# Patient Record
Sex: MALE | Race: WHITE | NOT HISPANIC OR LATINO | URBAN - METROPOLITAN AREA
[De-identification: names, ages, dates, MRNs, and addresses within clinical notes are randomized per-mention and may not be internally consistent; named-entity substitution may affect disease eponyms.]

---

## 2022-10-31 ENCOUNTER — OFFICE VISIT (OUTPATIENT)
Dept: URBAN - METROPOLITAN AREA CLINIC 111 | Facility: CLINIC | Age: 62
End: 2022-10-31

## 2022-11-30 ENCOUNTER — OFFICE VISIT (OUTPATIENT)
Dept: URBAN - METROPOLITAN AREA CLINIC 105 | Facility: CLINIC | Age: 62
End: 2022-11-30

## 2022-12-22 ENCOUNTER — WEB ENCOUNTER (OUTPATIENT)
Dept: URBAN - METROPOLITAN AREA CLINIC 105 | Facility: CLINIC | Age: 62
End: 2022-12-22

## 2022-12-28 ENCOUNTER — DASHBOARD ENCOUNTERS (OUTPATIENT)
Age: 62
End: 2022-12-28

## 2022-12-28 ENCOUNTER — OFFICE VISIT (OUTPATIENT)
Dept: URBAN - METROPOLITAN AREA CLINIC 105 | Facility: CLINIC | Age: 62
End: 2022-12-28
Payer: COMMERCIAL

## 2022-12-28 VITALS
WEIGHT: 200 LBS | DIASTOLIC BLOOD PRESSURE: 86 MMHG | HEART RATE: 76 BPM | BODY MASS INDEX: 29.62 KG/M2 | TEMPERATURE: 97.1 F | HEIGHT: 69 IN | SYSTOLIC BLOOD PRESSURE: 144 MMHG

## 2022-12-28 DIAGNOSIS — K64.8 INTERNAL HEMORRHOIDS: ICD-10-CM

## 2022-12-28 DIAGNOSIS — Z12.11 SCREENING FOR COLON CANCER: ICD-10-CM

## 2022-12-28 PROCEDURE — 99202 OFFICE O/P NEW SF 15 MIN: CPT | Performed by: INTERNAL MEDICINE

## 2022-12-28 RX ORDER — OMEPRAZOLE 40 MG/1
TAKE 1 CAPSULE (40 MG) BY ORAL ROUTE ONCE DAILY BEFORE A MEAL FOR 30 DAYS CAPSULE, DELAYED RELEASE PELLETS ORAL 1
Qty: 30 | Refills: 2 | Status: DISCONTINUED | COMMUNITY
Start: 1900-01-01

## 2022-12-28 RX ORDER — SODIUM, POTASSIUM,MAG SULFATES 17.5-3.13G
177 ML SOLUTION, RECONSTITUTED, ORAL ORAL
Qty: 1 KIT | Refills: 0 | OUTPATIENT
Start: 2022-12-28 | End: 2022-12-29

## 2022-12-28 RX ORDER — BISACODYL 5 MG
TAKE 4 TABLET, DELAYED RELEASE (ENTERIC COATED) ORAL
Qty: 4 | OUTPATIENT
Start: 2022-12-28 | End: 2022-12-29

## 2022-12-28 NOTE — HPI-TODAY'S VISIT:
Pt comes to arrange a colonoscopy. i did his normal colonoscopy in 2016. he complains of hemorrhoids. no change in bowel habits or FH

## 2023-02-13 ENCOUNTER — OFFICE VISIT (OUTPATIENT)
Dept: URBAN - METROPOLITAN AREA SURGERY CENTER 16 | Facility: SURGERY CENTER | Age: 63
End: 2023-02-13

## 2023-03-16 ENCOUNTER — WEB ENCOUNTER (OUTPATIENT)
Dept: URBAN - METROPOLITAN AREA SURGERY CENTER 16 | Facility: SURGERY CENTER | Age: 63
End: 2023-03-16

## 2023-03-20 ENCOUNTER — CLAIMS CREATED FROM THE CLAIM WINDOW (OUTPATIENT)
Dept: URBAN - METROPOLITAN AREA SURGERY CENTER 16 | Facility: SURGERY CENTER | Age: 63
End: 2023-03-20

## 2023-03-20 ENCOUNTER — CLAIMS CREATED FROM THE CLAIM WINDOW (OUTPATIENT)
Dept: URBAN - METROPOLITAN AREA SURGERY CENTER 16 | Facility: SURGERY CENTER | Age: 63
End: 2023-03-20
Payer: COMMERCIAL

## 2023-03-20 DIAGNOSIS — K64.8 EXTERNAL HEMORRHOIDS: ICD-10-CM

## 2023-03-20 DIAGNOSIS — Z12.11 COLON CANCER SCREENING: ICD-10-CM

## 2023-03-20 PROCEDURE — 46221 LIGATION OF HEMORRHOID(S): CPT | Performed by: INTERNAL MEDICINE

## 2023-03-20 PROCEDURE — G8907 PT DOC NO EVENTS ON DISCHARG: HCPCS | Performed by: INTERNAL MEDICINE

## 2023-03-20 PROCEDURE — 45378 DIAGNOSTIC COLONOSCOPY: CPT | Performed by: INTERNAL MEDICINE

## 2024-11-20 ENCOUNTER — OFFICE VISIT (OUTPATIENT)
Dept: URBAN - METROPOLITAN AREA CLINIC 105 | Facility: CLINIC | Age: 64
End: 2024-11-20
Payer: COMMERCIAL

## 2024-11-20 VITALS
TEMPERATURE: 97.3 F | WEIGHT: 209 LBS | DIASTOLIC BLOOD PRESSURE: 81 MMHG | BODY MASS INDEX: 30.96 KG/M2 | HEART RATE: 66 BPM | HEIGHT: 69 IN | SYSTOLIC BLOOD PRESSURE: 130 MMHG

## 2024-11-20 DIAGNOSIS — K21.9 GASTROESOPHAGEAL REFLUX DISEASE, UNSPECIFIED WHETHER ESOPHAGITIS PRESENT: ICD-10-CM

## 2024-11-20 DIAGNOSIS — K59.00 CONSTIPATION, UNSPECIFIED CONSTIPATION TYPE: ICD-10-CM

## 2024-11-20 DIAGNOSIS — K92.89 GAS BLOAT SYNDROME: ICD-10-CM

## 2024-11-20 DIAGNOSIS — K64.8 INTERNAL HEMORRHOIDS: ICD-10-CM

## 2024-11-20 DIAGNOSIS — R19.8 IRREGULAR BOWEL HABITS: ICD-10-CM

## 2024-11-20 PROBLEM — 235595009: Status: ACTIVE | Noted: 2024-11-20

## 2024-11-20 PROBLEM — 14760008: Status: ACTIVE | Noted: 2024-11-20

## 2024-11-20 PROCEDURE — 99214 OFFICE O/P EST MOD 30 MIN: CPT

## 2024-11-20 RX ORDER — PANTOPRAZOLE SODIUM 40 MG/1
1 TABLET 1/2 TO 1 HOUR BEFORE MORNING MEAL TABLET, DELAYED RELEASE ORAL ONCE A DAY
Qty: 90 TABLET | Refills: 0 | OUTPATIENT
Start: 2024-11-20

## 2024-11-20 NOTE — PHYSICAL EXAM GASTROINTESTINAL
Abdomen soft, tender superior and to the right of umbilicus, nondistended, no guarding or rigidity, no masses palpable, normal bowel sounds Liver and Spleen no hepatosplenomegaly Rectal deferred

## 2024-11-20 NOTE — HPI-TODAY'S VISIT:
64-year-old male with PMH of basal cell carcinoma, presenting to discuss abdominal pain. He has previously seen colorectal surgery for infected sebaceous cyst in gluteal region. Today, pt states he is on Cosentyx per dermatologist since 9/2024. States they went on vacation to [place] in October when he began to have sharp pain in epigastric area while drinking wine. States it has been on and off since then. Has not been consistent with particular triggers, even alcohol since then. Seems to be worse when he is stressed. He may take ibuprofen PRN but very infrequently. Never smoker.  He notes heartburn and regurgitation. States he has gotten close to vomiting. Denies significaint nausea. Notes gas and bloating.  He does note some change in bowel habits. Has had hard stools, soft stools, and even liquidy stools. He has had a little bit of bright red blood that he thinks it from hemorrhoid. Denies melena.  Typically has 2-3 BM/day. States he had CMP with rheumatology last month which was normal.

## 2024-11-21 LAB — LIPASE: 31

## 2024-11-25 ENCOUNTER — CLAIMS CREATED FROM THE CLAIM WINDOW (OUTPATIENT)
Dept: URBAN - METROPOLITAN AREA SURGERY CENTER 16 | Facility: SURGERY CENTER | Age: 64
End: 2024-11-25

## 2024-11-26 ENCOUNTER — TELEPHONE ENCOUNTER (OUTPATIENT)
Dept: URBAN - METROPOLITAN AREA CLINIC 105 | Facility: CLINIC | Age: 64
End: 2024-11-26

## 2025-01-10 ENCOUNTER — TELEPHONE ENCOUNTER (OUTPATIENT)
Dept: URBAN - METROPOLITAN AREA CLINIC 105 | Facility: CLINIC | Age: 65
End: 2025-01-10

## 2025-01-10 ENCOUNTER — OFFICE VISIT (OUTPATIENT)
Dept: URBAN - METROPOLITAN AREA TELEHEALTH 2 | Facility: TELEHEALTH | Age: 65
End: 2025-01-10
Payer: COMMERCIAL

## 2025-01-10 VITALS — WEIGHT: 211 LBS | HEIGHT: 69 IN | BODY MASS INDEX: 31.25 KG/M2

## 2025-01-10 DIAGNOSIS — K59.00 CONSTIPATION, UNSPECIFIED CONSTIPATION TYPE: ICD-10-CM

## 2025-01-10 DIAGNOSIS — K92.89 GAS BLOAT SYNDROME: ICD-10-CM

## 2025-01-10 DIAGNOSIS — R19.8 IRREGULAR BOWEL HABITS: ICD-10-CM

## 2025-01-10 DIAGNOSIS — R10.13 EPIGASTRIC PAIN: ICD-10-CM

## 2025-01-10 DIAGNOSIS — K64.8 INTERNAL HEMORRHOIDS: ICD-10-CM

## 2025-01-10 PROCEDURE — 99214 OFFICE O/P EST MOD 30 MIN: CPT | Performed by: INTERNAL MEDICINE

## 2025-01-10 RX ORDER — PANTOPRAZOLE SODIUM 40 MG/1
1 TABLET 1/2 TO 1 HOUR BEFORE MORNING MEAL TABLET, DELAYED RELEASE ORAL ONCE A DAY
Qty: 90 TABLET | Refills: 3
Start: 2024-11-20

## 2025-01-10 RX ORDER — PANTOPRAZOLE SODIUM 40 MG/1
1 TABLET 1/2 TO 1 HOUR BEFORE MORNING MEAL TABLET, DELAYED RELEASE ORAL ONCE A DAY
Qty: 90 TABLET | Refills: 0 | Status: ACTIVE | COMMUNITY
Start: 2024-11-20

## 2025-01-10 NOTE — HPI-TODAY'S VISIT:
11/20/2024 64-year-old male with PMH of basal cell carcinoma, presenting to discuss abdominal pain. He has previously seen colorectal surgery for infected sebaceous cyst in gluteal region. Today, pt states he is on Cosentyx per dermatologist since 9/2024. States they went on vacation to [place] in October when he began to have sharp pain in epigastric area while drinking wine. States it has been on and off since then. Has not been consistent with particular triggers, even alcohol since then. Seems to be worse when he is stressed. He may take ibuprofen PRN but very infrequently. Never smoker.  He notes heartburn and regurgitation. States he has gotten close to vomiting. Denies significaint nausea. Notes gas and bloating.  He does note some change in bowel habits. Has had hard stools, soft stools, and even liquidy stools. He has had a little bit of bright red blood that he thinks it from hemorrhoid. Denies melena.  Typically has 2-3 BM/day. States he had CMP with rheumatology last month which was normal.  1/10/2025 Pt presents for f/u via telehealth after EGD. At last visit, lipase ordered and normal. Pt was started on empiric pantoprazole 40 mg daily.  Today, pt states he has been taking pantoprazole 40 mg daily on empty stomach. He has not missed any doses. He has been making dietary changes, particularly with cutting back on coffee and alcohol. He admits to drinking 3-4 cups of coffee daily - he had cut back some but back up to this amount. He found that sweetened creamers tended to worsen symptoms. He has added Gaviscon PRN, per the recommendation of his dermatologist.  States that he is no longer having constant pain, but will still occasionally have pain depending on what he eats. For example, he had some grilled cheese with tomato soup the other day, and this worsened symptoms.  BMs still variable - sometimes hard and sometimes soft. He does not feel that he is constipated because he has regular BMs. He notes occasional bloating. Still has some occasional rectal bleeding related to his hemorrhoids.  He has psyllium husk at home, for his .  Labs 11/20/24 - Lipase normal.

## 2025-01-13 ENCOUNTER — WEB ENCOUNTER (OUTPATIENT)
Dept: URBAN - METROPOLITAN AREA CLINIC 105 | Facility: CLINIC | Age: 65
End: 2025-01-13

## 2025-03-11 ENCOUNTER — OFFICE VISIT (OUTPATIENT)
Dept: URBAN - METROPOLITAN AREA CLINIC 105 | Facility: CLINIC | Age: 65
End: 2025-03-11
Payer: COMMERCIAL

## 2025-03-11 VITALS
SYSTOLIC BLOOD PRESSURE: 136 MMHG | DIASTOLIC BLOOD PRESSURE: 82 MMHG | WEIGHT: 215 LBS | BODY MASS INDEX: 31.84 KG/M2 | TEMPERATURE: 97.2 F | HEART RATE: 81 BPM | HEIGHT: 69 IN

## 2025-03-11 DIAGNOSIS — R19.8 IRREGULAR BOWEL HABITS: ICD-10-CM

## 2025-03-11 DIAGNOSIS — R10.13 EPIGASTRIC PAIN: ICD-10-CM

## 2025-03-11 DIAGNOSIS — K59.00 CONSTIPATION, UNSPECIFIED CONSTIPATION TYPE: ICD-10-CM

## 2025-03-11 DIAGNOSIS — K64.8 INTERNAL HEMORRHOIDS: ICD-10-CM

## 2025-03-11 DIAGNOSIS — K92.89 GAS BLOAT SYNDROME: ICD-10-CM

## 2025-03-11 DIAGNOSIS — K21.00 GASTROESOPHAGEAL REFLUX DISEASE WITH ESOPHAGITIS WITHOUT HEMORRHAGE: ICD-10-CM

## 2025-03-11 PROBLEM — 266433003: Status: ACTIVE | Noted: 2025-03-11

## 2025-03-11 PROCEDURE — 99213 OFFICE O/P EST LOW 20 MIN: CPT

## 2025-03-11 RX ORDER — PANTOPRAZOLE SODIUM 40 MG/1
1 TABLET 1/2 TO 1 HOUR BEFORE MORNING MEAL TABLET, DELAYED RELEASE ORAL ONCE A DAY
OUTPATIENT
Start: 2024-11-20

## 2025-03-11 RX ORDER — PANTOPRAZOLE SODIUM 40 MG/1
1 TABLET 1/2 TO 1 HOUR BEFORE MORNING MEAL TABLET, DELAYED RELEASE ORAL ONCE A DAY
Qty: 90 TABLET | Refills: 3 | Status: ACTIVE | COMMUNITY
Start: 2024-11-20

## 2025-03-11 RX ORDER — ROSUVASTATIN CALCIUM 10 MG/1
1 TABLET TABLET, FILM COATED ORAL ONCE A DAY
Status: ACTIVE | COMMUNITY

## 2025-03-11 NOTE — HPI-TODAY'S VISIT:
11/20/2024 64-year-old male with PMH of basal cell carcinoma, presenting to discuss abdominal pain. He has previously seen colorectal surgery for infected sebaceous cyst in gluteal region. Today, pt states he is on Cosentyx per dermatologist since 9/2024. States they went on vacation to [place] in October when he began to have sharp pain in epigastric area while drinking wine. States it has been on and off since then. Has not been consistent with particular triggers, even alcohol since then. Seems to be worse when he is stressed. He may take ibuprofen PRN but very infrequently. Never smoker.  He notes heartburn and regurgitation. States he has gotten close to vomiting. Denies significaint nausea. Notes gas and bloating.  He does note some change in bowel habits. Has had hard stools, soft stools, and even liquidy stools. He has had a little bit of bright red blood that he thinks it from hemorrhoid. Denies melena.  Typically has 2-3 BM/day. States he had CMP with rheumatology last month which was normal.  1/10/2025 Pt presents for f/u via telehealth after EGD. At last visit, lipase ordered and normal. Pt was started on empiric pantoprazole 40 mg daily. Today, pt states he has been taking pantoprazole 40 mg daily on empty stomach. He has not missed any doses. He has been making dietary changes, particularly with cutting back on coffee and alcohol. He admits to drinking 3-4 cups of coffee daily - he had cut back some but back up to this amount. He found that sweetened creamers tended to worsen symptoms. He has added Gaviscon PRN, per the recommendation of his dermatologist.  States that he is no longer having constant pain, but will still occasionally have pain depending on what he eats. For example, he had some grilled cheese with tomato soup the other day, and this worsened symptoms.  BMs still variable - sometimes hard and sometimes soft. He does not feel that he is constipated because he has regular BMs. He notes occasional bloating. Still has some occasional rectal bleeding related to his hemorrhoids.  He has psyllium husk at home, for his .  03/11/2025 Pt presents for f/u. At last visit, he preferred to continue on regimen of pantoprazole 40 mg with Gaviscon PRN. He was encouraged increased fiber and water intake for constipation. He messaged asking about methods for colon cleanse and was recommended MiraLax bowel prep. Today, pt states he is no longer having abdominal pain, but notes it is "trial and error" in terms of figuring out what foods bother him or not. He has found that chocolate triggers abdominal pain. Creamers still bothersome. Tomato soup and some spicy things. He drinks milk without issue.  He will still get acid reflux once in a while, depending on what he eats. He takes Gaviscon PRN for symptoms. He is taking pantoprazole 40 mg daily. BMs are unpredictable. He notes he is currently in period of constipation. Then some days will have 3-4 large BMs. He has felt some bloating when he gets constipated. He has not yet done the MiraLax bowel prep but plans to.  Still occasional rectal bleeding.   Labs 11/20/24 - Lipase normal.